# Patient Record
(demographics unavailable — no encounter records)

---

## 2024-10-21 NOTE — PHYSICAL EXAM
[No Precordial Heave] : no precordial heave was noted [Normal S1] : normal S1 [Normal S2] : normal S2 [I] : a grade 1 [Normal] : no edema, no cyanosis, no clubbing, no varicosities

## 2024-10-21 NOTE — ADDENDUM
[FreeTextEntry1] : Joseph Deutsch assisted in documentation on Oct 21 2024 acting as a scribe for Dr. Elmo Morales.

## 2024-10-21 NOTE — HISTORY OF PRESENT ILLNESS
[FreeTextEntry1] : - Huang Larry, a 26-year-old male, presented with the chief complaint of heart pounding occurring at random times, for the past three to four months. He reported the sensations to be more prominent at night or after meals. This pounding was characterized as heavy and accentuated but without a corresponding increase in heart rate. Significantly, bouts of pounding were not associated with other symptoms such as dizziness, lightheadedness, or the urge to faint. However, they were known to induce episodes of anxiety and were accompanied by intermittent bouts of shortness of breath at rest and during activities such as walking. Huang's medical history is notably free of chronic medical conditions like high blood pressure or diabetes. Past surgical history includes an operation on his arm. He does not use tobacco products, but reports using Zins, a form of nicotine. He does not take any regular medications and has no known allergies to medicines. Despite these reports of palpitations and shortness of breath, routine medical evaluations, including blood work performed by Dr. Dinero, have not revealed any noteworthy abnormalities.

## 2024-10-21 NOTE — DISCUSSION/SUMMARY
[FreeTextEntry1] : Echo Lvef 55 Bicuspid AV 1-2 AI  Holter placed to r/o arrhythmias  reassurance charley [EKG obtained to assist in diagnosis and management of assessed problem(s)] : EKG obtained to assist in diagnosis and management of assessed problem(s)

## 2024-10-21 NOTE — REVIEW OF SYSTEMS
[SOB] : shortness of breath [Dyspnea on exertion] : dyspnea during exertion [Palpitations] : palpitations [Negative] : Gastrointestinal